# Patient Record
Sex: FEMALE | Race: OTHER | Employment: OTHER | URBAN - METROPOLITAN AREA
[De-identification: names, ages, dates, MRNs, and addresses within clinical notes are randomized per-mention and may not be internally consistent; named-entity substitution may affect disease eponyms.]

---

## 2022-02-16 ENCOUNTER — NEW PATIENT (OUTPATIENT)
Dept: URBAN - METROPOLITAN AREA CLINIC 32 | Facility: CLINIC | Age: 77
End: 2022-02-16

## 2022-02-16 DIAGNOSIS — H00.011: ICD-10-CM

## 2022-02-16 PROCEDURE — 92002 INTRM OPH EXAM NEW PATIENT: CPT

## 2022-02-16 ASSESSMENT — VISUAL ACUITY
OS_SC: 20/40-1
OD_SC: 20/40-1
OS_PH: 20/30-1

## 2022-02-16 ASSESSMENT — TONOMETRY
OD_IOP_MMHG: 15
OS_IOP_MMHG: 15

## 2022-02-16 NOTE — PATIENT DISCUSSION
Advised patient to massage her lids to loosen the oils and express any matter from the hordeolum after warm compresses.  Advised applying a little neosporn ointment after application of warm compress and lid massage.

## 2022-02-16 NOTE — PATIENT DISCUSSION
Advised patient that her lash extensions can contribute to the development of local infections due to harboring bacteria.

## 2022-02-21 ENCOUNTER — FOLLOW UP (OUTPATIENT)
Dept: URBAN - METROPOLITAN AREA CLINIC 32 | Facility: CLINIC | Age: 77
End: 2022-02-21

## 2022-02-21 DIAGNOSIS — H00.022: ICD-10-CM

## 2022-02-21 DIAGNOSIS — H00.011: ICD-10-CM

## 2022-02-21 PROCEDURE — 92012 INTRM OPH EXAM EST PATIENT: CPT

## 2022-02-21 ASSESSMENT — VISUAL ACUITY
OS_SC: 20/40
OD_SC: 20/40

## 2022-02-21 ASSESSMENT — TONOMETRY
OS_IOP_MMHG: 14
OD_IOP_MMHG: 14

## 2022-02-28 ENCOUNTER — FOLLOW UP (OUTPATIENT)
Dept: URBAN - METROPOLITAN AREA CLINIC 32 | Facility: CLINIC | Age: 77
End: 2022-02-28

## 2022-02-28 DIAGNOSIS — H00.011: ICD-10-CM

## 2022-02-28 PROCEDURE — 92012 INTRM OPH EXAM EST PATIENT: CPT

## 2022-02-28 ASSESSMENT — TONOMETRY
OS_IOP_MMHG: 13
OD_IOP_MMHG: 13

## 2022-02-28 ASSESSMENT — VISUAL ACUITY
OD_SC: 20/40
OS_SC: 20/40

## 2022-02-28 NOTE — PATIENT DISCUSSION
Again advised patient that her lash extensions can contribute to the development of local infections due to harboring bacteria.